# Patient Record
Sex: FEMALE | Race: ASIAN | NOT HISPANIC OR LATINO | Employment: OTHER | URBAN - METROPOLITAN AREA
[De-identification: names, ages, dates, MRNs, and addresses within clinical notes are randomized per-mention and may not be internally consistent; named-entity substitution may affect disease eponyms.]

---

## 2023-10-03 DIAGNOSIS — E89.0 POSTOPERATIVE HYPOTHYROIDISM: Primary | ICD-10-CM

## 2023-10-03 RX ORDER — LEVOTHYROXINE SODIUM 125 UG/1
125 TABLET ORAL
COMMUNITY
Start: 2021-05-05 | End: 2023-10-03 | Stop reason: SDUPTHER

## 2023-10-03 RX ORDER — LEVOTHYROXINE SODIUM 125 UG/1
125 TABLET ORAL
Qty: 90 TABLET | Refills: 3 | Status: SHIPPED | OUTPATIENT
Start: 2023-10-03

## 2023-10-03 NOTE — TELEPHONE ENCOUNTER
Pharmacy requesting medication refill of levothyroxine 125 mcg tablet . Patient is scheduled for follow up 04/02/2024. Coty Hernadez LPN

## 2024-04-01 ENCOUNTER — DOCUMENTATION (OUTPATIENT)
Dept: INTERNAL MEDICINE | Facility: HOSPITAL | Age: 49
End: 2024-04-01

## 2024-04-02 ENCOUNTER — OFFICE VISIT (OUTPATIENT)
Dept: ENDOCRINOLOGY | Facility: CLINIC | Age: 49
End: 2024-04-02
Payer: OTHER GOVERNMENT

## 2024-04-02 ENCOUNTER — LAB (OUTPATIENT)
Dept: LAB | Facility: LAB | Age: 49
End: 2024-04-02
Payer: OTHER GOVERNMENT

## 2024-04-02 VITALS
BODY MASS INDEX: 21.82 KG/M2 | TEMPERATURE: 98.6 F | DIASTOLIC BLOOD PRESSURE: 88 MMHG | RESPIRATION RATE: 16 BRPM | HEART RATE: 82 BPM | WEIGHT: 139.3 LBS | SYSTOLIC BLOOD PRESSURE: 136 MMHG

## 2024-04-02 DIAGNOSIS — E89.0 POSTOPERATIVE HYPOTHYROIDISM: Primary | ICD-10-CM

## 2024-04-02 DIAGNOSIS — C73 THYROID CANCER (MULTI): ICD-10-CM

## 2024-04-02 PROCEDURE — 99214 OFFICE O/P EST MOD 30 MIN: CPT | Performed by: INTERNAL MEDICINE

## 2024-04-02 ASSESSMENT — PAIN SCALES - GENERAL: PAINLEVEL: 0-NO PAIN

## 2024-04-02 NOTE — PATIENT INSTRUCTIONS
Eddie Rose,    It was a pleasure seeing you in clinic today! I'm glad you are doing well.     Continue with your current levothyroxine dosing for now. There are standing lab orders every couple months available for you to get these done and we can confirm the dosing is appropriate.    You should plan to get the thyroid ultrasound done in June.     I will refer you to dermatology for the left ear thickening.     Sincerely,  Dr. Gonzalez

## 2024-04-02 NOTE — PROGRESS NOTES
Eddie Rose is a 49 y.o. year old female presenting for thyroid cancer follow up     Last seen in endocrinology in 09/2023 by Dr. Gonzalez. At that visit, levothyroxine dosing changed to 125mcg 6 days a week and 1 week 125mcg 1/2 tb (67).  No palpitations with this.     Just came back from vacation and gained weight.     Neck:  denies lumps, voice changes, dysphagia    Left ear now swollen and never resolvedm thicker than usual. No injuries or trauma to that ear, but did have infection of that ear (itchy and was scratching). She did take amoxicillin for this and fever resolved.     She is feeling pretty good on current dosing. She just wants to know numbers.   She wishes she had the blood test orders prior to visit today.      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   She underwent radical thyroidectomy with multiple lymph node removal  with positive lymph nodes thyroid cancer invading the muscle in the neck and  received 150 mCi at in September 2018     She has been following with Dr. Flores the endocrine surgeon at the [facility] her last neck ultrasound done by him was 2021.  She is going to do another 1 next month with him  Her cancer was aggressive with positive lymph nodes 4 out of 10 as well as she had sternocleidomastoid muscle involvement  Her thyroglobulin's has been suppressed  She had problem originally tolerating high doses of levothyroxine enough to suppress the TSH due to anxiety and feeling of palpitations  She was taking 125 mcg with a borderline low TSH and a  suppressed thyroglobulin, she was tolerating that well  She saw another endocrinologist at the [facility] after my departure and her dose was adjusted up to 150 mcg with which she had symptoms as well as very suppressed TSH  She is now down to 137 mcg every day with that her TSH is 0.04 and she is feeling palpitations and anxiety  Her TSH General chemistry free T4 thyroglobulin levels from July 2020 December 2020 are reviewed from her records      Endorses neck tenderness/discomfort, feels more slouching with the neck scar    General: Denies fever or chills   Head: Denies headache or vision changes   Cardiac: Denies chest pain   Respiratory: Denies shortness of breath or cough   GI: Denies abdominal pain, nausea, or vomiting   Extremities: Denies swelling   Skin: Denies rash, skin changes     Vitals:    04/02/24 1028   BP: 136/88   Pulse: 82   Resp: 16   Temp: 37 °C (98.6 °F)      General: Well appearing, no acute distress  Neck: No visible goiter   Lungs: Breathing comfortably on room air   Skin: No rashes, left ear thickened compared to right        Outpatient medications     Current Outpatient Medications:     levothyroxine (Synthroid, Levoxyl) 125 mcg tablet, Take 1 tablet (125 mcg) by mouth once daily in the morning. Take before meals., Disp: 90 tablet, Rfl: 3      PAST MEDICAL HISTORY  Diagnosis Date  - Alopecia  - Eczema of external ear  - Multiple thyroid nodules 2018  - Panic anxiety syndrome  - Thyroid cancer (HCC) 05/2018     PAST SURGICAL HISTORY  - thyroidectomy with multiple LN     FAMILY HISTORY  Problem Relation Age of Onset  - Breast Cancer Mother   Dx ~ 45-51 y/o; No genetic testing  - Diabetes Mother  - Osteoporosis Mother  - Hyperlipidemia Father   Diet and exercise controlled  - Thyroid Father   Hyperthyroid  - Hypertension Father   diet controlled  - Cancer Maternal Uncle 70   Pancreatic     Social History   Marital status:  Spouse name:   Years of education: 12+ Number of  children: 2     Social History Main Topics   Smoking status: Never Smoker         Smokeless tobacco: Never Used   Alcohol use: No   Drug use: No   Sexual activity: Not Currently   Birth control/protection: None     PHYSICAL EXAM  General: no acute distress, alert and conversational, sitting in exam room chair  Eyes: no conjunctival icterus, EOMI grossly  Cardiac: RRR, no murmurs  Pulm: CTAB, no crackles or wheezes  Abd: soft, nondistended, nontender, no guarding or rebound  Extrem: no swelling  Psych: appropriate mood     LABS  07/2023  TSH 0.110  Free T4 2.1     06/2023  Lipid panel: chol 205H, TG 72, HDL 43, LDL 148H        06/2022  TSH 0.15L   Free T4 1.61H <1.11  Thyroglobulin N/A  Thyroglobulin LC-MS <0.5L  Anti-thyroglobulina abs: 8.6H <10.1         ASSESSMENT AND PLAN:     #Papillary thyroid cancer s/p radical thyroidectomy with multiple lymph node removal and VELAZCO   ::prolonged qrs   ::suppression goal TSH 0.05-0.2 with undetectable thyroglobulin  -continue current levothyroxine dosing (125 6 days per week and half dose on 7th day)  -next thyroid US due 06/2024 (most recent 06/2023 but unable to see results)  -repeat labs (will put in for q2 months so available for her whenever she needs it), should get Tg at least biannually  -CMP today for liver and calcium    -referral to dermatology for left ear thickening     RTC: next available January

## 2024-04-05 ENCOUNTER — OFFICE VISIT (OUTPATIENT)
Dept: DERMATOLOGY | Facility: CLINIC | Age: 49
End: 2024-04-05
Payer: OTHER GOVERNMENT

## 2024-04-05 ENCOUNTER — LAB (OUTPATIENT)
Dept: LAB | Facility: LAB | Age: 49
End: 2024-04-05
Payer: OTHER GOVERNMENT

## 2024-04-05 DIAGNOSIS — E89.0 POSTOPERATIVE HYPOTHYROIDISM: ICD-10-CM

## 2024-04-05 DIAGNOSIS — C73 THYROID CANCER (MULTI): ICD-10-CM

## 2024-04-05 DIAGNOSIS — L30.9 DERMATITIS: Primary | ICD-10-CM

## 2024-04-05 DIAGNOSIS — H61.032 CHONDRITIS OF AURICLE, LEFT: ICD-10-CM

## 2024-04-05 LAB
ALBUMIN SERPL BCP-MCNC: 4.4 G/DL (ref 3.4–5)
ALP SERPL-CCNC: 53 U/L (ref 33–110)
ALT SERPL W P-5'-P-CCNC: 13 U/L (ref 7–45)
ANION GAP SERPL CALC-SCNC: 11 MMOL/L (ref 10–20)
AST SERPL W P-5'-P-CCNC: 13 U/L (ref 9–39)
BILIRUB SERPL-MCNC: 0.6 MG/DL (ref 0–1.2)
BUN SERPL-MCNC: 17 MG/DL (ref 6–23)
CALCIUM SERPL-MCNC: 8.2 MG/DL (ref 8.6–10.3)
CHLORIDE SERPL-SCNC: 102 MMOL/L (ref 98–107)
CO2 SERPL-SCNC: 30 MMOL/L (ref 21–32)
CREAT SERPL-MCNC: 0.55 MG/DL (ref 0.5–1.05)
EGFRCR SERPLBLD CKD-EPI 2021: >90 ML/MIN/1.73M*2
GLUCOSE SERPL-MCNC: 81 MG/DL (ref 74–99)
POTASSIUM SERPL-SCNC: 3.8 MMOL/L (ref 3.5–5.3)
PROT SERPL-MCNC: 6.9 G/DL (ref 6.4–8.2)
SODIUM SERPL-SCNC: 139 MMOL/L (ref 136–145)
T4 FREE SERPL-MCNC: 1.21 NG/DL (ref 0.61–1.12)
TSH SERPL-ACNC: 0.26 MIU/L (ref 0.44–3.98)

## 2024-04-05 PROCEDURE — 69100 BIOPSY OF EXTERNAL EAR: CPT | Performed by: STUDENT IN AN ORGANIZED HEALTH CARE EDUCATION/TRAINING PROGRAM

## 2024-04-05 PROCEDURE — 99204 OFFICE O/P NEW MOD 45 MIN: CPT | Performed by: STUDENT IN AN ORGANIZED HEALTH CARE EDUCATION/TRAINING PROGRAM

## 2024-04-05 PROCEDURE — 84432 ASSAY OF THYROGLOBULIN: CPT

## 2024-04-05 PROCEDURE — 84439 ASSAY OF FREE THYROXINE: CPT

## 2024-04-05 PROCEDURE — 84443 ASSAY THYROID STIM HORMONE: CPT

## 2024-04-05 PROCEDURE — 80053 COMPREHEN METABOLIC PANEL: CPT

## 2024-04-05 PROCEDURE — 36415 COLL VENOUS BLD VENIPUNCTURE: CPT

## 2024-04-05 PROCEDURE — 86800 THYROGLOBULIN ANTIBODY: CPT

## 2024-04-05 RX ORDER — CLOBETASOL PROPIONATE 0.5 MG/G
OINTMENT TOPICAL
Qty: 60 G | Refills: 3 | Status: SHIPPED | OUTPATIENT
Start: 2024-04-05

## 2024-04-05 ASSESSMENT — DERMATOLOGY PATIENT ASSESSMENT
ARE YOU TRYING TO GET PREGNANT: NO
HAVE YOU HAD OR DO YOU HAVE VASCULAR DISEASE: NO
DO YOU USE A TANNING BED: NO
ARE YOU ON BIRTH CONTROL: NO
HAVE YOU HAD OR DO YOU HAVE A STAPH INFECTION: NO
DO YOU HAVE IRREGULAR MENSTRUAL CYCLES: NO
DO YOU HAVE ANY NEW OR CHANGING LESIONS: NO
DO YOU USE SUNSCREEN: DAILY
ARE YOU AN ORGAN TRANSPLANT RECIPIENT: NO

## 2024-04-05 ASSESSMENT — DERMATOLOGY QUALITY OF LIFE (QOL) ASSESSMENT
RATE HOW BOTHERED YOU ARE BY SYMPTOMS OF YOUR SKIN PROBLEM (EG, ITCHING, STINGING BURNING, HURTING OR SKIN IRRITATION): 1
DATE THE QUALITY-OF-LIFE ASSESSMENT WAS COMPLETED: 66935
RATE HOW BOTHERED YOU ARE BY EFFECTS OF YOUR SKIN PROBLEMS ON YOUR ACTIVITIES (EG, GOING OUT, ACCOMPLISHING WHAT YOU WANT, WORK ACTIVITIES OR YOUR RELATIONSHIPS WITH OTHERS): 1
RATE HOW EMOTIONALLY BOTHERED YOU ARE BY YOUR SKIN PROBLEM (FOR EXAMPLE, WORRY, EMBARRASSMENT, FRUSTRATION): 1
ARE THERE EXCLUSIONS OR EXCEPTIONS FOR THE QUALITY OF LIFE ASSESSMENT: NO
WHAT SINGLE SKIN CONDITION LISTED BELOW IS THE PATIENT ANSWERING THE QUALITY-OF-LIFE ASSESSMENT QUESTIONS ABOUT: NONE OF THE ABOVE

## 2024-04-05 ASSESSMENT — ITCH NUMERIC RATING SCALE: HOW SEVERE IS YOUR ITCHING?: 5

## 2024-04-05 NOTE — PROGRESS NOTES
Subjective     Eddie Rose is a 49 y.o. female who presents for the following: Skin Check (Swelling and discoloration of both ears).     Left ear cartilage was red/swollen and had fever, she took amoxicillin, ear swelling never returned back to normal  Also ,15+ year itchy areas inside both ears  + history of papillary thyroid cancer      Review of Systems:  No other skin or systemic complaints other than what is documented elsewhere in the note.    The following portions of the chart were reviewed this encounter and updated as appropriate:          Skin Cancer History  No skin cancer on file.      Specialty Problems    None       Objective   Well appearing patient in no apparent distress; mood and affect are within normal limits.    A focused skin examination was performed. All findings within normal limits unless otherwise noted below.    Assessment/Plan   1. Dermatitis  Left Ear, Right Ear  Bilateral diffuse thickening and lichenifications on inner aspect of ears, sparing helices involving conchal bowl, cymba, triangular fossa and more    Specimen 1 - Dermatopathology- DERM LAB  Differential Diagnosis: inflammatory vs depositional vs other. Has history of thyroid cancer  Check Margins Yes/No?:    Comments:    Dermpath Lab: Routine Histopathology (formalin-fixed tissue)    Related Medications  clobetasol (Temovate) 0.05 % ointment  Small amount daily for 2 weeks, break for 2 weeks, continue as needed    2. Chondritis of auricle, left  Left Ear  There isthickening on the left helix compared to right  Per patient, had episode of pain and disomcort and neve reallywent down to normal          Will refer to rheumatology to evaluate for relapsing polychondritis      Unsure if was infection led to cartilage inflammation? Or relapsing polychondritis? Or related to scratching from item #1  Either way, will await biopsy results and refer to rheum to rule out relapsing polychondritis

## 2024-04-09 LAB
LABORATORY COMMENT REPORT: NORMAL
PATH REPORT.FINAL DX SPEC: NORMAL
PATH REPORT.GROSS SPEC: NORMAL
PATH REPORT.RELEVANT HX SPEC: NORMAL
PATH REPORT.TOTAL CANCER: NORMAL

## 2024-04-11 LAB
BILL ONLY-THYROGLOBULIN LC-MS/MS: NORMAL
THYROGLOB AB SERPL-ACNC: 5.6 IU/ML (ref 0–4)
THYROGLOB SERPL-MCNC: <0.5 NG/ML (ref 1.3–31.8)
THYROGLOB SERPL-MCNC: ABNORMAL NG/ML (ref 1.3–31.8)

## 2024-10-23 DIAGNOSIS — E89.0 POSTOPERATIVE HYPOTHYROIDISM: ICD-10-CM

## 2024-10-23 RX ORDER — LEVOTHYROXINE SODIUM 125 UG/1
125 TABLET ORAL DAILY
Qty: 90 TABLET | Refills: 3 | Status: SHIPPED | OUTPATIENT
Start: 2024-10-23

## 2024-12-11 ENCOUNTER — OFFICE VISIT (OUTPATIENT)
Dept: URGENT CARE | Age: 49
End: 2024-12-11
Payer: OTHER GOVERNMENT

## 2024-12-11 ENCOUNTER — LAB (OUTPATIENT)
Dept: LAB | Facility: LAB | Age: 49
End: 2024-12-11
Payer: OTHER GOVERNMENT

## 2024-12-11 ENCOUNTER — HOSPITAL ENCOUNTER (OUTPATIENT)
Dept: RADIOLOGY | Facility: CLINIC | Age: 49
Discharge: HOME | End: 2024-12-11
Payer: OTHER GOVERNMENT

## 2024-12-11 VITALS
DIASTOLIC BLOOD PRESSURE: 98 MMHG | HEART RATE: 70 BPM | OXYGEN SATURATION: 96 % | RESPIRATION RATE: 18 BRPM | WEIGHT: 132 LBS | TEMPERATURE: 98 F | SYSTOLIC BLOOD PRESSURE: 168 MMHG | BODY MASS INDEX: 20.67 KG/M2

## 2024-12-11 DIAGNOSIS — E89.0 POSTOPERATIVE HYPOTHYROIDISM: ICD-10-CM

## 2024-12-11 DIAGNOSIS — R05.1 ACUTE COUGH: Primary | ICD-10-CM

## 2024-12-11 DIAGNOSIS — R05.1 ACUTE COUGH: ICD-10-CM

## 2024-12-11 DIAGNOSIS — G47.00 INSOMNIA, UNSPECIFIED TYPE: ICD-10-CM

## 2024-12-11 DIAGNOSIS — R53.83 OTHER FATIGUE: ICD-10-CM

## 2024-12-11 DIAGNOSIS — R03.0 ELEVATED BLOOD PRESSURE READING: ICD-10-CM

## 2024-12-11 DIAGNOSIS — F41.9 ANXIETY: ICD-10-CM

## 2024-12-11 DIAGNOSIS — C73 THYROID CANCER (MULTI): ICD-10-CM

## 2024-12-11 DIAGNOSIS — E89.0 POSTOPERATIVE HYPOTHYROIDISM: Primary | ICD-10-CM

## 2024-12-11 LAB
ALBUMIN SERPL BCP-MCNC: 4.6 G/DL (ref 3.4–5)
ALP SERPL-CCNC: 61 U/L (ref 33–110)
ALT SERPL W P-5'-P-CCNC: 14 U/L (ref 7–45)
ANION GAP SERPL CALC-SCNC: 13 MMOL/L (ref 10–20)
AST SERPL W P-5'-P-CCNC: 14 U/L (ref 9–39)
BILIRUB SERPL-MCNC: 0.5 MG/DL (ref 0–1.2)
BUN SERPL-MCNC: 14 MG/DL (ref 6–23)
CALCIUM SERPL-MCNC: 8.9 MG/DL (ref 8.6–10.6)
CHLORIDE SERPL-SCNC: 100 MMOL/L (ref 98–107)
CO2 SERPL-SCNC: 31 MMOL/L (ref 21–32)
CREAT SERPL-MCNC: 0.52 MG/DL (ref 0.5–1.05)
EGFRCR SERPLBLD CKD-EPI 2021: >90 ML/MIN/1.73M*2
ERYTHROCYTE [DISTWIDTH] IN BLOOD BY AUTOMATED COUNT: 12.3 % (ref 11.5–14.5)
GLUCOSE SERPL-MCNC: 87 MG/DL (ref 74–99)
HCT VFR BLD AUTO: 40 % (ref 36–46)
HGB BLD-MCNC: 13.4 G/DL (ref 12–16)
MCH RBC QN AUTO: 29.8 PG (ref 26–34)
MCHC RBC AUTO-ENTMCNC: 33.5 G/DL (ref 32–36)
MCV RBC AUTO: 89 FL (ref 80–100)
NRBC BLD-RTO: 0 /100 WBCS (ref 0–0)
PLATELET # BLD AUTO: 292 X10*3/UL (ref 150–450)
POTASSIUM SERPL-SCNC: 3.5 MMOL/L (ref 3.5–5.3)
PROT SERPL-MCNC: 7.5 G/DL (ref 6.4–8.2)
RBC # BLD AUTO: 4.5 X10*6/UL (ref 4–5.2)
SODIUM SERPL-SCNC: 140 MMOL/L (ref 136–145)
T4 FREE SERPL-MCNC: 1.94 NG/DL (ref 0.78–1.48)
TSH SERPL-ACNC: 0.86 MIU/L (ref 0.44–3.98)
VIT B12 SERPL-MCNC: 610 PG/ML (ref 211–911)
WBC # BLD AUTO: 4.6 X10*3/UL (ref 4.4–11.3)

## 2024-12-11 PROCEDURE — 71046 X-RAY EXAM CHEST 2 VIEWS: CPT

## 2024-12-11 PROCEDURE — 71046 X-RAY EXAM CHEST 2 VIEWS: CPT | Performed by: RADIOLOGY

## 2024-12-11 PROCEDURE — 82607 VITAMIN B-12: CPT

## 2024-12-11 PROCEDURE — 84443 ASSAY THYROID STIM HORMONE: CPT

## 2024-12-11 PROCEDURE — 80053 COMPREHEN METABOLIC PANEL: CPT

## 2024-12-11 PROCEDURE — 86800 THYROGLOBULIN ANTIBODY: CPT

## 2024-12-11 PROCEDURE — 85027 COMPLETE CBC AUTOMATED: CPT

## 2024-12-11 PROCEDURE — 84432 ASSAY OF THYROGLOBULIN: CPT

## 2024-12-11 PROCEDURE — 99214 OFFICE O/P EST MOD 30 MIN: CPT | Performed by: FAMILY MEDICINE

## 2024-12-11 PROCEDURE — 1036F TOBACCO NON-USER: CPT | Performed by: FAMILY MEDICINE

## 2024-12-11 PROCEDURE — 36415 COLL VENOUS BLD VENIPUNCTURE: CPT

## 2024-12-11 PROCEDURE — 84439 ASSAY OF FREE THYROXINE: CPT

## 2024-12-11 RX ORDER — BENZONATATE 100 MG/1
100 CAPSULE ORAL 3 TIMES DAILY PRN
Qty: 30 CAPSULE | Refills: 0 | Status: SHIPPED | OUTPATIENT
Start: 2024-12-11 | End: 2024-12-21

## 2024-12-11 RX ORDER — HYDROXYZINE PAMOATE 25 MG/1
25 CAPSULE ORAL NIGHTLY PRN
Qty: 30 CAPSULE | Refills: 0 | Status: SHIPPED | OUTPATIENT
Start: 2024-12-11 | End: 2025-01-10

## 2024-12-11 RX ORDER — DOXYCYCLINE 100 MG/1
100 CAPSULE ORAL 2 TIMES DAILY
Qty: 14 CAPSULE | Refills: 0 | Status: SHIPPED | OUTPATIENT
Start: 2024-12-11 | End: 2024-12-18

## 2024-12-11 ASSESSMENT — PATIENT HEALTH QUESTIONNAIRE - PHQ9
2. FEELING DOWN, DEPRESSED OR HOPELESS: NOT AT ALL
1. LITTLE INTEREST OR PLEASURE IN DOING THINGS: NOT AT ALL
SUM OF ALL RESPONSES TO PHQ9 QUESTIONS 1 AND 2: 0

## 2024-12-11 ASSESSMENT — PAIN SCALES - GENERAL: PAINLEVEL_OUTOF10: 2

## 2024-12-11 NOTE — PROGRESS NOTES
HPI:  Patient states that for the past 2 weeks she had a cough.  Pt states that it was hard for her to sleep at night.  Pt had thyroid CA before and has a hard time bringing up mucus from tightness in throat muscles.  No fever.  No SOB or CP.  Pt also states that she has anxiety and thinks that it might be keeping her up at night as well.  Pt states that she requested blood work from her endocrinologist and had it done earlier today.  Pt was taking OTC meds for cough with no relief.            ROS:  No fever  +cough  +fatigue  +anxiety  +trouble sleeping at night    PE:    A&O x3  NCAT  PERRLA, EOMI  TM clear bl  No pharyngeal erythema, +pnd  RRR  Good air movement, no w/r/r  MOEx4  No focal deficit  Judgement normal    Results:  CXR: initial read no infiltrate    A/P:   Cough  Insomnia  Anxiety  Elevated blood pressure reading    We will call you with xray results if you need further treatment.  Increase fluids.  Rest.  Vaporub.  Cool mist humidifier. Do not take any OTC meds for cough/cold.  Monitor blood pressure at home and recheck with your doctor in 3-4 days if remains elevated. Keep a diary of symptoms.  Eat yogurt and take probiotics when on medication.  Tylenol as needed for pain.  Recheck with your doctor in a week if no improvement. Go to the ER if starts getting worse.

## 2024-12-12 DIAGNOSIS — E89.0 POSTOPERATIVE HYPOTHYROIDISM: Primary | ICD-10-CM

## 2024-12-12 DIAGNOSIS — C73 THYROID CANCER (MULTI): ICD-10-CM

## 2024-12-12 RX ORDER — LEVOTHYROXINE SODIUM 112 UG/1
112 TABLET ORAL DAILY
Qty: 30 TABLET | Refills: 11 | Status: SHIPPED | OUTPATIENT
Start: 2024-12-12 | End: 2025-12-12

## 2024-12-19 LAB
THYROGLOB AB SERPL-ACNC: 4.8 IU/ML (ref 0–0.9)
THYROGLOB SERPL-MCNC: <0.2 NG/ML (ref 1.5–38.5)

## 2025-01-28 ENCOUNTER — APPOINTMENT (OUTPATIENT)
Dept: ENDOCRINOLOGY | Facility: CLINIC | Age: 50
End: 2025-01-28
Payer: OTHER GOVERNMENT

## 2025-01-28 VITALS — BODY MASS INDEX: 20.4 KG/M2 | WEIGHT: 130 LBS | HEIGHT: 67 IN

## 2025-01-28 DIAGNOSIS — E89.0 POSTOPERATIVE HYPOTHYROIDISM: Primary | ICD-10-CM

## 2025-01-28 DIAGNOSIS — C73 THYROID CANCER (MULTI): ICD-10-CM

## 2025-01-28 PROCEDURE — 3008F BODY MASS INDEX DOCD: CPT | Performed by: INTERNAL MEDICINE

## 2025-01-28 PROCEDURE — 99214 OFFICE O/P EST MOD 30 MIN: CPT | Performed by: INTERNAL MEDICINE

## 2025-01-28 RX ORDER — LEVOTHYROXINE SODIUM 125 UG/1
125 TABLET ORAL DAILY
Qty: 90 TABLET | Refills: 3 | Status: SHIPPED | OUTPATIENT
Start: 2025-01-28 | End: 2026-01-28

## 2025-01-28 ASSESSMENT — PATIENT HEALTH QUESTIONNAIRE - PHQ9
2. FEELING DOWN, DEPRESSED OR HOPELESS: NOT AT ALL
SUM OF ALL RESPONSES TO PHQ9 QUESTIONS 1 AND 2: 0
1. LITTLE INTEREST OR PLEASURE IN DOING THINGS: NOT AT ALL

## 2025-02-01 LAB
25(OH)D3+25(OH)D2 SERPL-MCNC: 51 NG/ML (ref 30–100)
EST. AVERAGE GLUCOSE BLD GHB EST-MCNC: 117 MG/DL
EST. AVERAGE GLUCOSE BLD GHB EST-SCNC: 6.5 MMOL/L
HBA1C MFR BLD: 5.7 % OF TOTAL HGB
T3FREE SERPL-MCNC: 3.5 PG/ML (ref 2.3–4.2)
T4 FREE SERPL-MCNC: 1.8 NG/DL (ref 0.8–1.8)
THYROGLOB AB SERPL-ACNC: NORMAL [IU]/ML
TSH SERPL-ACNC: 0.3 MIU/L

## 2025-02-04 ENCOUNTER — HOSPITAL ENCOUNTER (OUTPATIENT)
Dept: RADIOLOGY | Facility: HOSPITAL | Age: 50
Discharge: HOME | End: 2025-02-04
Payer: OTHER GOVERNMENT

## 2025-02-04 PROCEDURE — 76536 US EXAM OF HEAD AND NECK: CPT | Performed by: RADIOLOGY

## 2025-02-04 PROCEDURE — 76536 US EXAM OF HEAD AND NECK: CPT

## 2025-02-05 LAB
T3FREE SERPL-MCNC: 3.5 PG/ML (ref 2.3–4.2)
T4 FREE SERPL-MCNC: 1.8 NG/DL (ref 0.8–1.8)
THYROGLOB AB SERPL-ACNC: 5 IU/ML
THYROGLOB SERPL-MCNC: ABNORMAL NG/DL
TSH SERPL-ACNC: 0.3 MIU/L

## 2025-02-13 ENCOUNTER — TELEPHONE (OUTPATIENT)
Dept: ENDOCRINOLOGY | Facility: CLINIC | Age: 50
End: 2025-02-13
Payer: OTHER GOVERNMENT

## 2025-02-13 NOTE — TELEPHONE ENCOUNTER
----- Message from Eduardo Phelps sent at 2/13/2025 11:50 AM EST -----  Devendra Marin,    I would agree with Toñito.  I do not see anything on this ultrasound in her thyroid bed.  I am honestly not sure what radiology is measuring.  This does not look like any residual thyroid tissue.  In addition, I saw that you updated blood work for her in December.  Her thyroglobulin level was undetectable on the liquid chromatography study.  That was with a TSH of around 0.8.  If she had that much thyroid tissue present, she should have a detectable thyroglobulin level.    I do not see anything worrisome here.  Hope this helps.  ----- Message -----  From: Tania Gonzalez MD  Sent: 2/5/2025   4:07 PM EST  To: Eduardo Phelps MD    This is a high risk for recurrence thyroid cancer.  Her original surgeon from [facility], told her he sees something suspicious during US.  She is from [facility], so I sent her back to [facility], who examined her and did not see anything.  She was too anxious, and wanted to double check.  I did order another US neck.    Can you look at it for me, and see if it is worth for you to see her for possible FNA of the HARDIN or the remnants they are talking about.    I will appreciate any guidance  ----- Message -----  From: [facility] Results In  Sent: 2/5/2025  11:21 AM EST  To: Tania Gonzalez MD

## 2025-02-14 LAB
T3FREE SERPL-MCNC: 3.5 PG/ML (ref 2.3–4.2)
T4 FREE SERPL-MCNC: 1.8 NG/DL (ref 0.8–1.8)
THYROGLOB AB SERPL-ACNC: 5 IU/ML
THYROGLOB SERPL-MCNC: <0.4 NG/ML
TSH SERPL-ACNC: 0.3 MIU/L

## 2025-03-07 DIAGNOSIS — E89.0 POSTOPERATIVE HYPOTHYROIDISM: ICD-10-CM

## 2025-03-07 DIAGNOSIS — C73 THYROID CANCER (MULTI): ICD-10-CM

## 2025-04-03 ENCOUNTER — APPOINTMENT (OUTPATIENT)
Dept: OTOLARYNGOLOGY | Facility: CLINIC | Age: 50
End: 2025-04-03
Payer: OTHER GOVERNMENT

## 2025-04-14 DIAGNOSIS — C73 THYROID CANCER (MULTI): ICD-10-CM

## 2025-04-14 RX ORDER — LEVOTHYROXINE SODIUM 125 UG/1
125 TABLET ORAL DAILY
Qty: 90 TABLET | Refills: 3 | Status: SHIPPED | OUTPATIENT
Start: 2025-04-14 | End: 2026-04-14

## 2025-04-18 DIAGNOSIS — C73 THYROID CANCER (MULTI): ICD-10-CM

## 2025-04-18 DIAGNOSIS — E89.0 POSTOPERATIVE HYPOTHYROIDISM: ICD-10-CM

## 2025-04-23 RX ORDER — LEVOTHYROXINE SODIUM 112 UG/1
TABLET ORAL
Qty: 30 TABLET | Refills: 11 | Status: SHIPPED | OUTPATIENT
Start: 2025-04-23

## 2025-05-30 DIAGNOSIS — C73 THYROID CANCER (MULTI): ICD-10-CM

## 2025-05-30 DIAGNOSIS — E89.0 POSTOPERATIVE HYPOTHYROIDISM: ICD-10-CM

## 2025-06-23 DIAGNOSIS — C73 THYROID CANCER (MULTI): ICD-10-CM

## 2025-06-23 DIAGNOSIS — E89.0 POSTOPERATIVE HYPOTHYROIDISM: ICD-10-CM

## 2025-07-11 DIAGNOSIS — E89.0 POSTOPERATIVE HYPOTHYROIDISM: ICD-10-CM

## 2025-07-11 DIAGNOSIS — C73 THYROID CANCER (MULTI): ICD-10-CM

## 2025-07-17 NOTE — PROGRESS NOTES
Consults    Reason For Consult  Thyroid cancer and thyroid d    History Of Present Illness    Eddie Rose is a 50 y.o. female presenting with thyroid cancer and hypothyroidism follow up .       When she was seen by  her local ENT who did her original surgery in [place], they mentioned few possible new growth areas  on the US of her neck     Dr Flores repeated the US 1/2025 and US reviewed     Her thyroglobulin is not back yet.  TSH and antibodies back, but thyroglobulin not back.      She was found to have high BP  It was 180's at urgent care,    Will see DR Young in 4/2025     She changed her dose to 125 mcg 1/1/2025.     For a long time levothyroxine dosing changed to 125mcg 6 days a week and 1 week 125mcg 1/2 tb (67).        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   She underwent radical thyroidectomy with multiple lymph node removal  with positive lymph nodes thyroid cancer invading the muscle in the neck and  received 150 mCi at in September 2018     She has been following with Dr. Flores the endocrine surgeon at the [facility]   Her cancer was aggressive with positive lymph nodes 4 out of 10 as well as she had sternocleidomastoid muscle involvement  Her thyroglobulin's has been suppressed  She had problem originally tolerating high doses of levothyroxine enough to suppress the TSH due to anxiety and feeling of palpitations  She was taking 125 mcg with a borderline low TSH and a suppressed thyroglobulin, she was tolerating that well  On 137 mcg every day with that her  TC   TSH is 0.04 and she is feeling palpitations and anxiety         NOTE FROM DR VINCENT:    I saw your patient Eddie Rose in follow-up evaluation for papillary thyroid cancer.  In June 2018 in [facility] she underwent a total thyroidectomy with bilateral modified neck dissections for metastatic papillary thyroid cancer.  6 of 7 central neck nodes and 24 of 92 jugular chain lymph nodes were involved.  She underwent subsequent radioiodine ablation.  Pathology on her thyroid showed a classic type papillary cancer that was BRAF positive.     Prompting today's visit was a recent follow-up done in [place] where lymph nodes had been question on ultrasound examination.  She had been having some flulike symptoms at that time.  She remains on 125 mcg of thyroid hormone daily.  She feels well on her current dose of thyroid hormone medication without hyperthyroid type symptoms.     Laboratory studies done on 12/11/2024 show a TSH of 0.86.  Her thyroglobulin antibody is mildly elevated at 4.8.  Therefore thyroglobulin was measured by LC MS and was undetectable.     Follow-up laboratory studies done on 1/14/2025 show a TSH of 0.237.  Her thyroglobulin measurements are pending.     In the office today she is well-appearing.  She has a well-healed transverse incision from her prior thyroid procedure.  Palpation of the neck shows no palpable thyroid tissue nor any abnormal neck nodes.  Particular attention was paid to the upper part of the jugular chain.     Ultrasound examination was performed in the office.  This confirms surgical absence of the thyroid gland.  I can appreciate no worrisome lymph nodes in either central neck compartment nor in either jugular chain.  Her salivary glands appear normal.  Typically there are slightly more prominent lymph nodes located just beneath the salivary glands however this is not apparent on her examination today.     I am pleased that she continues to do well without evidence of recurrent  disease.  I am unclear what structures were being question on her prior neck ultrasound but any case or not apparent today.  My plan is to see her back in the office in 1 years time with a TSH and thyroglobulin prior to that visit.     I appreciate being involved in the care of your patient and please feel free to contact me should you have additional questions.     Sincerely,     Calixto Flores MD      Any family history of thyroid cancer? no  Any personal history of radiation to your head/neck? no    Past Medical History  She has a past medical history of Other specified disorders involving the immune mechanism, not elsewhere classified, Personal history of malignant neoplasm of thyroid, and Personal history of other endocrine, nutritional and metabolic disease.    Surgical History  She has a past surgical history that includes Other surgical history (03/11/2021).     Social History  She reports that she has never smoked. She has never used smokeless tobacco. She reports that she does not drink alcohol and does not use drugs.    Family History  No family history on file.     Allergies  Patient has no known allergies.    Review of Systems    Past Medical History:   Diagnosis Date    Other specified disorders involving the immune mechanism, not elsewhere classified     Autoimmune disorder    Personal history of malignant neoplasm of thyroid     History of thyroid cancer    Personal history of other endocrine, nutritional and metabolic disease     History of thyroid disorder       Past Surgical History:   Procedure Laterality Date    OTHER SURGICAL HISTORY  03/11/2021    Thyroidectomy       Social History     Socioeconomic History    Marital status:      Spouse name: Not on file    Number of children: Not on file    Years of education: Not on file    Highest education level: Not on file   Occupational History    Not on file   Tobacco Use    Smoking status: Never    Smokeless tobacco: Never   Substance and  Sexual Activity    Alcohol use: Never    Drug use: Never    Sexual activity: Not on file   Other Topics Concern    Not on file   Social History Narrative    Not on file     Social Drivers of Health     Financial Resource Strain: Low Risk  (9/22/2021)    Received from [facility]    Overall Financial Resource Strain (CARDIA)     Difficulty of Paying Living Expenses: Not hard at all   Food Insecurity: No Food Insecurity (9/22/2021)    Received from [facility]    Hunger Vital Sign     Worried About Running Out of Food in the Last Year: Never true     Ran Out of Food in the Last Year: Never true   Transportation Needs: No Transportation Needs (9/22/2021)    Received from [facility]    PRAPARE - Transportation     Lack of Transportation (Medical): No     Lack of Transportation (Non-Medical): No   Physical Activity: Insufficiently Active (9/22/2021)    Received from [facility]    Exercise Vital Sign     Days of Exercise per Week: 2 days     Minutes of Exercise per Session: 60 min   Stress: No Stress Concern Present (9/22/2021)    Received from [facility] of Occupational Health - Occupational Stress Questionnaire     Feeling of Stress : Only a little   Social Connections: Socially Integrated (9/22/2021)    Received from [facility]    Social Connection and Isolation Panel [NHANES]     Frequency of Communication with Friends and Family: More than three times a week     Frequency of Social Gatherings with Friends and Family: Once a week     Attends Yazidism Services: More than 4 times per year     Active Member of Clubs or Organizations: Yes     Attends Club or Organization Meetings: More than 4 times per year     Marital Status:    Intimate Partner Violence: Not on file   Housing Stability: Low Risk  (9/22/2021)    Received from [facility]  "Clinic    Housing Stability Vital Sign     Unable to Pay for Housing in the Last Year: No     Number of Places Lived in the Last Year: 1     Unstable Housing in the Last Year: No        Physical Exam     ROS, PMH, FH/SH, surgical history and allergies have been reviewed.    Last Recorded Vitals  Height 1.702 m (5' 7\"), weight 59 kg (130 lb).    Relevant Results         If you would like to pull in Medications, type .meds     If you would like to pull in Lab results for the last 24 hours, type .[id number]    If you would like to pull in specific Lab results, type .ll     If you would like to pull in Imaging results, type .[id number] :99}  Lab Review  Lab Results   Component Value Date    BILITOT 0.5 12/11/2024    CALCIUM 8.9 12/11/2024    CO2 31 12/11/2024     12/11/2024    CREATININE 0.52 12/11/2024    GLUCOSE 87 12/11/2024    ALKPHOS 61 12/11/2024    K 3.5 12/11/2024    PROT 7.5 12/11/2024     12/11/2024    AST 14 12/11/2024    ALT 14 12/11/2024    BUN 14 12/11/2024    ANIONGAP 13 12/11/2024    ALBUMIN 4.6 12/11/2024    GFRF >90 06/15/2022     No results found for: \"TRIG\", \"CHOL\", \"LDLCALC\", \"HDL\"  No results found for: \"HGBA1C\"  The ASCVD Risk score (Deshawn DK, et al., 2019) failed to calculate for the following reasons:    Cannot find a previous HDL lab    Cannot find a previous total cholesterol lab       Assessment/Plan   Problem List Items Addressed This Visit    None  Visit Diagnoses         Codes    Postoperative hypothyroidism    -  Primary E89.0    Relevant Orders    Thyroxine, Free    Triiodothyronine, Free    Thyroid Stimulating Hormone    Thyroglobulin and Antithyroglobulin    Thyroid cancer (Multi)     C73    Relevant Medications    levothyroxine (Synthroid, Levoxyl) 125 mcg tablet    Other Relevant Orders    Thyroxine, Free    Triiodothyronine, Free    Thyroid Stimulating Hormone    Thyroglobulin and Antithyroglobulin    Follow Up In Endocrinology    US neck                Papillary thyroid " cancer s/p radical thyroidectomy with multiple lymph node removal and VELAZCO   ::prolonged qrs follows with [facility] preventive cardiology   Recent HTN diagnosis at ED  Will be seeing her cardiologist next week to address the issue   Monitoring BP at home, all below 160's    ::suppression goal TSH 0.05-0.2 with undetectable thyroglobulin  -continue current levothyroxine dosing she increased it herself to 125 mcg, daily , long time she was stable on 6 days per week and half dose on 7th day)  -next thyroid US ordered   -repeat labs (will put in for q2 months so available for her whenever she needs it), should get Tg at least biannually     I spent a total of 30+ minutes on the date of the service which included preparing to see the patient, face-to-face patient care, completing clinical documentation, obtaining and / or reviewing separately obtained history, counseling and educating the patient/family/caregiver, ordering medications, tests, or procedures, communicating with other healthcare providers (not separately reported), independently interpreting results, not separately reported, and communicating results to the patient/family/caregiver, real-time telemedicine visit using audiovisual technology with patient's verbal consent.  Name and date of birth verified.      Tania Gonzalez MD

## 2025-08-09 ENCOUNTER — PATIENT MESSAGE (OUTPATIENT)
Dept: ENDOCRINOLOGY | Facility: CLINIC | Age: 50
End: 2025-08-09
Payer: OTHER GOVERNMENT

## 2025-08-12 ENCOUNTER — APPOINTMENT (OUTPATIENT)
Dept: ENDOCRINOLOGY | Facility: CLINIC | Age: 50
End: 2025-08-12
Payer: OTHER GOVERNMENT

## 2025-08-12 DIAGNOSIS — C73 THYROID CANCER (MULTI): ICD-10-CM

## 2025-08-12 DIAGNOSIS — E89.0 POSTOPERATIVE HYPOTHYROIDISM: ICD-10-CM

## 2025-08-12 PROCEDURE — 99214 OFFICE O/P EST MOD 30 MIN: CPT | Performed by: INTERNAL MEDICINE

## 2025-08-12 RX ORDER — LEVOTHYROXINE SODIUM 112 UG/1
TABLET ORAL
Qty: 90 TABLET | Refills: 3 | Status: SHIPPED | OUTPATIENT
Start: 2025-08-12

## 2025-08-13 LAB
T3FREE SERPL-MCNC: 3.1 PG/ML (ref 2.3–4.2)
T4 FREE SERPL-MCNC: 1.6 NG/DL (ref 0.8–1.8)
THYROGLOB AB SERPL-ACNC: 4 IU/ML
THYROGLOB SERPL-MCNC: ABNORMAL NG/DL
TSH SERPL-ACNC: 0.24 MIU/L

## 2025-08-21 LAB
T3FREE SERPL-MCNC: 3.1 PG/ML (ref 2.3–4.2)
T4 FREE SERPL-MCNC: 1.6 NG/DL (ref 0.8–1.8)
THYROGLOB AB SERPL-ACNC: 4 IU/ML
THYROGLOB SERPL-MCNC: <0.4 NG/ML
TSH SERPL-ACNC: 0.24 MIU/L

## 2025-08-22 DIAGNOSIS — C73 THYROID CANCER (MULTI): ICD-10-CM

## 2025-08-22 DIAGNOSIS — E89.0 POSTOPERATIVE HYPOTHYROIDISM: ICD-10-CM
